# Patient Record
Sex: FEMALE | Race: BLACK OR AFRICAN AMERICAN | NOT HISPANIC OR LATINO | Employment: OTHER | URBAN - METROPOLITAN AREA
[De-identification: names, ages, dates, MRNs, and addresses within clinical notes are randomized per-mention and may not be internally consistent; named-entity substitution may affect disease eponyms.]

---

## 2022-10-11 ENCOUNTER — TELEPHONE (OUTPATIENT)
Dept: PLASTIC SURGERY | Facility: CLINIC | Age: 32
End: 2022-10-11

## 2022-10-11 NOTE — TELEPHONE ENCOUNTER
Spoke w/pt and informed her that her BMI of 38.9 is too high. Pt was given goal weight to reach BMI of 35 and instructed to call back to r/s once achieved.

## 2022-12-19 ENCOUNTER — OFFICE VISIT (OUTPATIENT)
Dept: PLASTIC SURGERY | Facility: CLINIC | Age: 32
End: 2022-12-19
Payer: MEDICAID

## 2022-12-19 VITALS
DIASTOLIC BLOOD PRESSURE: 87 MMHG | SYSTOLIC BLOOD PRESSURE: 131 MMHG | HEIGHT: 67 IN | BODY MASS INDEX: 36.94 KG/M2 | HEART RATE: 106 BPM | WEIGHT: 235.38 LBS

## 2022-12-19 DIAGNOSIS — N62 MACROMASTIA: Primary | ICD-10-CM

## 2022-12-19 PROCEDURE — 99204 PR OFFICE/OUTPT VISIT, NEW, LEVL IV, 45-59 MIN: ICD-10-PCS | Mod: S$GLB,,, | Performed by: SURGERY

## 2022-12-19 PROCEDURE — 3008F PR BODY MASS INDEX (BMI) DOCUMENTED: ICD-10-PCS | Mod: CPTII,S$GLB,, | Performed by: SURGERY

## 2022-12-19 PROCEDURE — 3008F BODY MASS INDEX DOCD: CPT | Mod: CPTII,S$GLB,, | Performed by: SURGERY

## 2022-12-19 PROCEDURE — 3079F PR MOST RECENT DIASTOLIC BLOOD PRESSURE 80-89 MM HG: ICD-10-PCS | Mod: CPTII,S$GLB,, | Performed by: SURGERY

## 2022-12-19 PROCEDURE — 1159F MED LIST DOCD IN RCRD: CPT | Mod: CPTII,S$GLB,, | Performed by: SURGERY

## 2022-12-19 PROCEDURE — 3075F SYST BP GE 130 - 139MM HG: CPT | Mod: CPTII,S$GLB,, | Performed by: SURGERY

## 2022-12-19 PROCEDURE — 99204 OFFICE O/P NEW MOD 45 MIN: CPT | Mod: S$GLB,,, | Performed by: SURGERY

## 2022-12-19 PROCEDURE — 1159F PR MEDICATION LIST DOCUMENTED IN MEDICAL RECORD: ICD-10-PCS | Mod: CPTII,S$GLB,, | Performed by: SURGERY

## 2022-12-19 PROCEDURE — 3075F PR MOST RECENT SYSTOLIC BLOOD PRESS GE 130-139MM HG: ICD-10-PCS | Mod: CPTII,S$GLB,, | Performed by: SURGERY

## 2022-12-19 PROCEDURE — 3079F DIAST BP 80-89 MM HG: CPT | Mod: CPTII,S$GLB,, | Performed by: SURGERY

## 2022-12-19 RX ORDER — CLINDAMYCIN HYDROCHLORIDE 300 MG/1
CAPSULE ORAL
COMMUNITY
Start: 2022-12-08

## 2022-12-19 NOTE — PROGRESS NOTES
"      REFERRAL FOR BREAST REDUCTION    Chief Complaint  Large breasts    Referring provider:  Primary Doctor No    HPI  Sobiaonereza Dhillon is a 32 y.o. female.  She currently wears a G cup bra and prefers to be smaller.   She has attempted to mitigate symptoms with weight loss last year and then gained weight back,  analgesics, and wearing extra bras.  Her weight has been fluctuating for 6 months.     She denies breast mass or skin change.   Personal hx of breast biopsy: none  Personalfamily history of breastovarian cancer: none    She reports associated complaints of:  Restricted physical activity  Feeling "hunched over"  Chronic breast pain secondary to weight       does not desire any more children  She works at home.    Tobacco use: none  Marijuana use: none    PMH  Past Medical History:   Diagnosis Date    Arthritis of ankle     Pelvic fracture          PSH  Past Surgical History:   Procedure Laterality Date    ANKLE SURGERY Right 2020    APPENDECTOMY      HIP FRACTURE SURGERY Right     PELVIC FRACTURE SURGERY Right        OBSTETRIC HISTORY  OB History    No obstetric history on file.         FH  Family History   Problem Relation Age of Onset    Diabetes Mother     Hypertension Mother     No Known Problems Father     No Known Problems Sister     No Known Problems Brother     Congenital heart disease Maternal Grandmother     No Known Problems Maternal Grandfather        MEDICATIONS  Outpatient Medications Marked as Taking for the 22 encounter (Office Visit) with Radha Paredes MD   Medication Sig Dispense Refill    clindamycin (CLEOCIN) 300 MG capsule          ALLERGIES   Review of patient's allergies indicates:   Allergen Reactions    Metronidazole (bulk) Rash    Penicillins     Flagyl [metronidazole] Rash    Percocet [oxycodone-acetaminophen] Rash       SOCIAL HISTORY  Tobacco:   Social History     Tobacco Use   Smoking Status Never   Smokeless Tobacco Never     EtOH:   Social History     Substance " "and Sexual Activity   Alcohol Use Yes    Comment: rarely         ROS  Review of Systems   Constitutional: Negative for chills, fever and malaise/fatigue.   HENT: Negative for congestion.    Eyes: Negative for blurred vision and double vision.   Respiratory: Negative for cough and sputum production.    Cardiovascular: Negative for chest pain and palpitations.   Gastrointestinal: Negative for nausea and vomiting.   Genitourinary: Negative for dysuria and hematuria.   Musculoskeletal: Positive for back pain and neck pain. Negative for joint pain.   Skin: Negative for itching and rash.   Neurological: Negative for dizziness, seizures and headaches.   Psychiatric/Behavioral: Negative for depression. The patient is not nervous/anxious.          PHYSICAL EXAM  /87   Pulse 106   Ht 5' 7" (1.702 m)   Wt 106.8 kg (235 lb 6.4 oz)   BMI 36.87 kg/m²   Body mass index is 36.87 kg/m².  Estimated body surface area is 2.25 meters squared as calculated from the following:    Height as of this encounter: 5' 7" (1.702 m).    Weight as of this encounter: 106.8 kg (235 lb 6.4 oz).    Constitutional: Pt is oriented to person, place, and time.  Pt appears well-developed and well-nourished.   HENT: Normocephalic and atraumatic.   Pulmonary/Chest: Effort normal. No respiratory distress.   Abdomen: Soft. Non-tender. No masses or distension.  Musculoskeletal: Normal range of motion. Pt exhibits no edema or deformity.   Neurological: Pt is alert and oriented to person, place, and time. No sensory deficit. Exhibits normal muscle tone.   Skin: Skin is warm. No rash noted. No erythema.         BREASTS  Asymmetry in size   Ptosis Grade 3  No Masses or skin change appreciated in either breast.  No lymphadenopathy  No active Excoriation or skin discoloration inframammary fold or midline  Trapezial Hypertrophy present  Areola widened, no nipple discharge elicited. Nipple sensation present.  Lipodystrophy: Axillary roll, lateral chest " wall  No Breast scars    Measurements  R SN to N- 37  L SN to N - 36    R N to IMF- 13  L N to IMF-13      PHOTOS                        ASSESSMENT  32 y.o. female with Macromastia, symptomatic > 6 months with sufficient evidence of functional impairments related to breast weight.       Today we discussed indications for surgery and I believe she would greatly benefit from a reduction of 978 g in each breast at least. We discussed a superior pedicle and keyhole pattern skin reduction to restore nipple position and achieve desired goal. The location of her scars were demonstrated today. We did discuss that her size would not be guaranteed, however we would ensure that her breasts are proportional to her chest frame.      Preoperative, perioperative and postoperative plans were discussed in detail. Risks of surgery and alternatives to surgery were also discussed today.     The influence of her BMI on her postoperative outcomes was discussed in detail today including but not limited to poor wound healing, infections, scarring, andor nipple loss. She does report a hindrance to proper exercise secondary to breast size and paindiscomfort, so I believe proceeding with reduction first would be beneficial for her. She understands risks and would like to proceed.     She is <35 years of age with no known family history of breast cancer or elevated personal risk. We will not pursue preoperative mammogram, but will establish baseline 6 mo after surgery andor at age 37.     She understands that any breast tissue removed during surgery will be weighed and sent to pathology for a thorough evaluation. She understands that this is not a cancer operation and that if atypicalpremalignantor malignant cells are found, further surgery may be warranted after the appropriate consultations with surgical oncology are completed.     She has considered these options and she would like to proceed with breast reduction.   We will provide  add on professional fees for her desired fat grafting to shoulder grooving, liposuction of axillary and chest wall lipodystrophy.   We took photos today to obtain pre-authorization for this clinically indicated procedure.  All of her questions were answered today to her satisfaction.       Radha Paredes MD FACS

## 2023-01-17 DIAGNOSIS — Z01.818 PREOP EXAMINATION: ICD-10-CM

## 2023-01-17 DIAGNOSIS — N62 MACROMASTIA: Primary | ICD-10-CM

## 2023-01-17 RX ORDER — ONDANSETRON 4 MG/1
4 TABLET, FILM COATED ORAL EVERY 6 HOURS PRN
Qty: 20 TABLET | Refills: 0 | Status: SHIPPED | OUTPATIENT
Start: 2023-01-17 | End: 2023-01-22

## 2023-01-17 RX ORDER — DOXYCYCLINE 100 MG/1
100 CAPSULE ORAL 2 TIMES DAILY
Qty: 14 CAPSULE | Refills: 0 | Status: SHIPPED | OUTPATIENT
Start: 2023-01-17 | End: 2023-01-24

## 2023-01-17 RX ORDER — HYDROCODONE BITARTRATE AND ACETAMINOPHEN 5; 325 MG/1; MG/1
TABLET ORAL
Qty: 20 TABLET | Refills: 0 | Status: SHIPPED | OUTPATIENT
Start: 2023-01-17

## 2023-01-17 RX ORDER — KETOROLAC TROMETHAMINE 10 MG/1
10 TABLET, FILM COATED ORAL EVERY 6 HOURS
Qty: 20 TABLET | Refills: 0 | Status: SHIPPED | OUTPATIENT
Start: 2023-01-17 | End: 2023-01-22

## 2023-01-17 NOTE — TELEPHONE ENCOUNTER
Patient is coming tomorrow 1/18/23 to preop for breast reduction. Please print and sign prescriptions will return for consents prior to surgery.

## 2023-01-18 ENCOUNTER — CLINICAL SUPPORT (OUTPATIENT)
Dept: PLASTIC SURGERY | Facility: CLINIC | Age: 33
End: 2023-01-18
Payer: MEDICAID

## 2023-01-18 LAB
ANION GAP SERPL CALC-SCNC: 6 MMOL/L (ref 3–11)
BASOPHILS NFR BLD: 0.4 % (ref 0–3)
BUN SERPL-MCNC: 9 MG/DL (ref 7–18)
BUN/CREAT SERPL: 10.34 RATIO (ref 7–18)
CALCIUM SERPL-MCNC: 9.4 MG/DL (ref 8.8–10.5)
CHLORIDE SERPL-SCNC: 104 MMOL/L (ref 100–108)
CO2 SERPL-SCNC: 28 MMOL/L (ref 21–32)
CREAT SERPL-MCNC: 0.87 MG/DL (ref 0.55–1.02)
EOSINOPHIL NFR BLD: 1.5 % (ref 1–3)
ERYTHROCYTE [DISTWIDTH] IN BLOOD BY AUTOMATED COUNT: 12 % (ref 12.5–18)
GFR ESTIMATION: > 60
GLUCOSE SERPL-MCNC: 73 MG/DL (ref 70–110)
HCT VFR BLD AUTO: 39.9 % (ref 37–47)
HGB BLD-MCNC: 13.4 G/DL (ref 12–16)
LYMPHOCYTES NFR BLD: 37.5 % (ref 25–40)
MCH RBC QN AUTO: 29.8 PG (ref 27–31.2)
MCHC RBC AUTO-ENTMCNC: 33.6 G/DL (ref 31.8–35.4)
MCV RBC AUTO: 88.7 FL (ref 80–97)
MONOCYTES NFR BLD: 6.8 % (ref 1–15)
NEUTROPHILS # BLD AUTO: 2.85 10*3/UL (ref 1.8–7.7)
NEUTROPHILS NFR BLD: 53.6 % (ref 37–80)
NUCLEATED RED BLOOD CELLS: 0 %
PLATELETS: 256 10*3/UL (ref 142–424)
POTASSIUM SERPL-SCNC: 3.6 MMOL/L (ref 3.6–5.2)
RBC # BLD AUTO: 4.5 10*6/UL (ref 4.2–5.4)
SODIUM BLD-SCNC: 138 MMOL/L (ref 135–145)
WBC # BLD: 5.3 10*3/UL (ref 4.6–10.2)

## 2023-01-18 NOTE — PROGRESS NOTES
Pt presents for preoperative and post operative instructions, scripts and orders given to patient. She is to return on Monday to sign consents with Dr. Paredes.  Cotinine negatigve

## 2023-01-19 ENCOUNTER — TELEPHONE (OUTPATIENT)
Dept: PLASTIC SURGERY | Facility: CLINIC | Age: 33
End: 2023-01-19
Payer: MEDICAID

## 2023-01-23 ENCOUNTER — OFFICE VISIT (OUTPATIENT)
Dept: PLASTIC SURGERY | Facility: CLINIC | Age: 33
End: 2023-01-23
Payer: MEDICAID

## 2023-01-23 VITALS
HEART RATE: 70 BPM | WEIGHT: 235 LBS | DIASTOLIC BLOOD PRESSURE: 68 MMHG | SYSTOLIC BLOOD PRESSURE: 120 MMHG | HEIGHT: 67 IN | BODY MASS INDEX: 36.88 KG/M2

## 2023-01-23 DIAGNOSIS — N62 MACROMASTIA: Primary | ICD-10-CM

## 2023-01-23 PROCEDURE — 1159F MED LIST DOCD IN RCRD: CPT | Mod: CPTII,S$GLB,, | Performed by: SURGERY

## 2023-01-23 PROCEDURE — 3008F PR BODY MASS INDEX (BMI) DOCUMENTED: ICD-10-PCS | Mod: CPTII,S$GLB,, | Performed by: SURGERY

## 2023-01-23 PROCEDURE — 99213 PR OFFICE/OUTPT VISIT, EST, LEVL III, 20-29 MIN: ICD-10-PCS | Mod: 57,S$GLB,, | Performed by: SURGERY

## 2023-01-23 PROCEDURE — 3078F PR MOST RECENT DIASTOLIC BLOOD PRESSURE < 80 MM HG: ICD-10-PCS | Mod: CPTII,S$GLB,, | Performed by: SURGERY

## 2023-01-23 PROCEDURE — 3078F DIAST BP <80 MM HG: CPT | Mod: CPTII,S$GLB,, | Performed by: SURGERY

## 2023-01-23 PROCEDURE — 3074F PR MOST RECENT SYSTOLIC BLOOD PRESSURE < 130 MM HG: ICD-10-PCS | Mod: CPTII,S$GLB,, | Performed by: SURGERY

## 2023-01-23 PROCEDURE — 3074F SYST BP LT 130 MM HG: CPT | Mod: CPTII,S$GLB,, | Performed by: SURGERY

## 2023-01-23 PROCEDURE — 3008F BODY MASS INDEX DOCD: CPT | Mod: CPTII,S$GLB,, | Performed by: SURGERY

## 2023-01-23 PROCEDURE — 99213 OFFICE O/P EST LOW 20 MIN: CPT | Mod: 57,S$GLB,, | Performed by: SURGERY

## 2023-01-23 PROCEDURE — 1159F PR MEDICATION LIST DOCUMENTED IN MEDICAL RECORD: ICD-10-PCS | Mod: CPTII,S$GLB,, | Performed by: SURGERY

## 2023-01-23 NOTE — PROGRESS NOTES
"Dictation #2  MRN:[id number]  CSN:[id number]       Preoperative exam for breast reduction     Chief Complaint  Large breasts    Referring provider:  Primary Doctor No    HPI  Sobiaonereza Dhillon is a 32 y.o. female.  She currently wears a G cup bra and prefers to be smaller.   She has attempted to mitigate symptoms with weight loss last year and then gained weight back,  analgesics, and wearing extra bras.  Her weight has been fluctuating for 6 months.     She denies breast mass or skin change.   Personal hx of breast biopsy: none  Personalfamily history of breastovarian cancer: none    She reports associated complaints of:  Restricted physical activity  Feeling "hunched over"  Chronic breast pain secondary to weight       does not desire any more children  She works at home.    Tobacco use: none  Marijuana use: none    PMH  Past Medical History:   Diagnosis Date    Arthritis of ankle     Pelvic fracture          PSH  Past Surgical History:   Procedure Laterality Date    ANKLE SURGERY Right 2020    APPENDECTOMY      HIP FRACTURE SURGERY Right     PELVIC FRACTURE SURGERY Right        OBSTETRIC HISTORY  OB History    No obstetric history on file.         FH  Family History   Problem Relation Age of Onset    Diabetes Mother     Hypertension Mother     No Known Problems Father     No Known Problems Sister     No Known Problems Brother     Congenital heart disease Maternal Grandmother     No Known Problems Maternal Grandfather        MEDICATIONS  No outpatient medications have been marked as taking for the 23 encounter (Office Visit) with Radha Paredes MD.       ALLERGIES   Review of patient's allergies indicates:   Allergen Reactions    Metronidazole (bulk) Rash    Penicillins     Flagyl [metronidazole] Rash    Percocet [oxycodone-acetaminophen] Rash       SOCIAL HISTORY  Tobacco:   Social History     Tobacco Use   Smoking Status Never   Smokeless Tobacco Never     EtOH:   Social History     Substance and Sexual " "Activity   Alcohol Use Yes    Comment: rarely         ROS  Review of Systems   Constitutional: Negative for chills, fever and malaise/fatigue.   HENT: Negative for congestion.    Eyes: Negative for blurred vision and double vision.   Respiratory: Negative for cough and sputum production.    Cardiovascular: Negative for chest pain and palpitations.   Gastrointestinal: Negative for nausea and vomiting.   Genitourinary: Negative for dysuria and hematuria.   Musculoskeletal: Positive for back pain and neck pain. Negative for joint pain.   Skin: Negative for itching and rash.   Neurological: Negative for dizziness, seizures and headaches.   Psychiatric/Behavioral: Negative for depression. The patient is not nervous/anxious.          PHYSICAL EXAM  /68   Pulse 70   Ht 5' 7" (1.702 m)   Wt 106.6 kg (235 lb)   BMI 36.81 kg/m²   Body mass index is 36.81 kg/m².  Estimated body surface area is 2.24 meters squared as calculated from the following:    Height as of this encounter: 5' 7" (1.702 m).    Weight as of this encounter: 106.6 kg (235 lb).    Constitutional: Pt is oriented to person, place, and time.  Pt appears well-developed and well-nourished.   HENT: Normocephalic and atraumatic.   Pulmonary/Chest: Effort normal. No respiratory distress.   Abdomen: Soft. Non-tender. No masses or distension.  Musculoskeletal: Normal range of motion. Pt exhibits no edema or deformity.   Neurological: Pt is alert and oriented to person, place, and time. No sensory deficit. Exhibits normal muscle tone.   Skin: Skin is warm. No rash noted. No erythema.         BREASTS  Asymmetry in size   Ptosis Grade 3  No Masses or skin change appreciated in either breast.  No lymphadenopathy  No active Excoriation or skin discoloration inframammary fold or midline  Trapezial Hypertrophy present  Areola widened, no nipple discharge elicited. Nipple sensation present.  Lipodystrophy: Axillary roll, lateral chest wall  No Breast " scars    Measurements  R SN to N- 37  L SN to N - 36    R N to IMF- 13  L N to IMF-13      PHOTOS                        ASSESSMENT  32 y.o. female with Macromastia, symptomatic > 6 months with sufficient evidence of functional impairments related to breast weight.       Today we discussed indications for surgery and I believe she would greatly benefit from a reduction of 978 g in each breast at least. We discussed a superior pedicle and keyhole pattern skin reduction to restore nipple position and achieve desired goal. The location of her scars were demonstrated today. We did discuss that her size would not be guaranteed, however we would ensure that her breasts are proportional to her chest frame.      Preoperative, perioperative and postoperative plans were discussed in detail. Risks of surgery and alternatives to surgery were also discussed today.     The influence of her BMI on her postoperative outcomes was discussed in detail today including but not limited to poor wound healing, infections, scarring, andor nipple loss. She does report a hindrance to proper exercise secondary to breast size and paindiscomfort, so I believe proceeding with reduction first would be beneficial for her. She understands risks and would like to proceed.     She is <35 years of age with no known family history of breast cancer or elevated personal risk. We will not pursue preoperative mammogram, but will establish baseline 6 mo after surgery andor at age 37.     She understands that any breast tissue removed during surgery will be weighed and sent to pathology for a thorough evaluation. She understands that this is not a cancer operation and that if atypicalpremalignantor malignant cells are found, further surgery may be warranted after the appropriate consultations with surgical oncology are completed.          INFORMED CONSENT  The procedure was fully discussed with the patient. Outpatient or hospital ambulatory surgery  disposition under general anesthesia were explained. She is a candidate for a keyhole superior pedicle; the location of her scars was demonstrated. Her idealized bra size after surgery was solicited for surgical planning, although I explained her bra size after surgery could not be guaranteed.    Procedure: Bilateral breast reduction  Nonsurgical and surgical treatment options were reviewed.  Possible risks of breast reduction include but are not limited to:  -bleeding  -infection  -heavy and prolonged or permanent scarring, possible keloid formation  -breast lumps, hardness  -breasts different size, shape  -loss of nipple sensation  -hypersensitivity of nipple-areolar complex  -wound separation or delayed wound healing  -venous thromboembolism  -complications from anesthesia  -difficulty with future mammograms  -emotional problems or negative impact on relationships from altered breast size  -desired breast size not attained: breasts too small or too large  -difficult bra fitting  -poor cosmetic outcome  -puckering of incisions  -irregular shape, nipple location  -need for further surgery  -inability to breast feed      PLAN        Disposition: outpatient or hospital ambulatory surgery  General anesthesia.  Labs: CBC, Electrolytes, pregnancy test, cotinine  Antibiotic prophylaxis: Cefazolin 2  GM preop  Caprini risk (3) Antiembolic prophylaxis with KACEY and sequential pneumatic devices.               Pt presents today for preoperative exam for bilateral breast reduction scheduled for 1/24/23. Consents reviewed with patient. Pre and post operative instructions given to patient. Copies of prescriptions, orders, instructions and consents given to patient today.

## 2023-01-24 ENCOUNTER — OUTSIDE PLACE OF SERVICE (OUTPATIENT)
Dept: PLASTIC SURGERY | Facility: CLINIC | Age: 33
End: 2023-01-24

## 2023-01-24 LAB
B-HCG UR QL: NEGATIVE
COTININE, URINE: NORMAL

## 2023-01-24 PROCEDURE — 19318 PR REDUCTION OF LARGE BREAST: ICD-10-PCS | Mod: 50,,, | Performed by: SURGERY

## 2023-01-24 PROCEDURE — 19318 BREAST REDUCTION: CPT | Mod: 50,,, | Performed by: SURGERY

## 2023-01-25 ENCOUNTER — TELEPHONE (OUTPATIENT)
Dept: PAIN MEDICINE | Facility: CLINIC | Age: 33
End: 2023-01-25
Payer: MEDICAID

## 2023-01-25 LAB — SPECIMEN TO PATHOLOGY: NORMAL

## 2023-01-25 NOTE — TELEPHONE ENCOUNTER
Pt doing well this am, states she had a small blood clot in her right drain but otherwise no issues. She is emptying her drains twice a day. Instructed she can shower tomorrow remove dressings and reapply clean dressings. Will see patient on Friday to check drains. Rediscussed how to milk to tubing as well.

## 2023-01-27 ENCOUNTER — CLINICAL SUPPORT (OUTPATIENT)
Dept: PLASTIC SURGERY | Facility: CLINIC | Age: 33
End: 2023-01-27
Payer: MEDICAID

## 2023-01-27 VITALS
HEART RATE: 86 BPM | HEIGHT: 67 IN | DIASTOLIC BLOOD PRESSURE: 85 MMHG | BODY MASS INDEX: 36.88 KG/M2 | SYSTOLIC BLOOD PRESSURE: 135 MMHG | OXYGEN SATURATION: 98 % | WEIGHT: 235 LBS

## 2023-01-27 DIAGNOSIS — Z98.890 STATUS POST BREAST REDUCTION: Primary | ICD-10-CM

## 2023-01-27 RX ORDER — DOXYCYCLINE 100 MG/1
100 CAPSULE ORAL 2 TIMES DAILY
COMMUNITY
Start: 2023-01-24

## 2023-01-27 RX ORDER — KETOROLAC TROMETHAMINE 10 MG/1
TABLET, FILM COATED ORAL
COMMUNITY
Start: 2023-01-24 | End: 2024-01-10

## 2023-01-27 NOTE — PROGRESS NOTES
"POST-OPERATIVE EXAM    CC  Chief Complaint   Patient presents with    Follow-up     3 days post breast reduction       PROCEDURE  Bilateral breast reduction 1/24/23    SUBJECTIVE  Preoperative symptoms have improved.  No fevers or pain uncontrolled.    Denies fevers, drainage, or wound concerns.   NATHAN drain 30 cc in each drain over the last 24 hours same for the previous 24 hours.  PHYSICAL EXAM  /85 (BP Location: Left arm, Patient Position: Sitting, BP Method: Small (Automatic))   Pulse 86   Ht 5' 7" (1.702 m)   Wt 106.6 kg (235 lb)   LMP  (LMP Unknown)   SpO2 98%   BMI 36.81 kg/m²   Breast symmetry and shape good, soft no fat necrosis  Minor bruising   Healing primarily  No masses  Nipples sensate  NAC healthy, viable  Surgical site  Incisions clean dry and intact, tape intact   No seroma or hematoma    ASSESSMENT  Encounter Diagnoses   Name Primary?    Status post breast reduction Yes     No signs of complications.  Patient is doing well after surgery.   Allow time for contour and scar maturation.          PLAN  Care/instructions were reviewed, written handout given (see AVS).  Continue sport bra/surgical bra  No upper body exercise/heavy lifting x 1 month  Ok to shower  F/u on Monday  Bilateral drains removed after cleaning with CHG, bacitracin and bandaide applied to openings. Instructed to shower daily, apply bacitracin and new bandage daily         WOUND CARE INSTRUCTIONS  BATHING  You may shower normally. No soaking tub bath or swimming pool until cleared by Dr. Paredes.    WOUND CARE  Reinforce incisions with adaptec/dry gauze until completely healed  Continue supportive sports bra for 6 weeks after surgery  You may resume wearing a bra with under-wire after 6 weeks or once any wounds are healed.    ACTIVITY  You may resume moderate exercise (walking, incline walking, stationary bike)   You may progress to full exercise after 4 weeks.  Avoid heavy lifting, running, swimming, strenuous activity for " 4 weeks.  Avoid travel out of town for 4 weeks.    MEDICATIONS  Take pain medication as needed:  Tylenol (acetominophen) 500 mg every 6 hours for mild pain.  Motrin (Ibuprofen) 600 mg every 8 hours for mild pain.  DO NOT exceed 4 grams of Tylenol in a 24 h period  Continue your usual medications and vitamins    SCAR MANAGEMENT  Scars may take over 1 year to mature.  Some scars will remain pink, dark purple, and possibly raised for 6-9 months after surgery.  After one year, scars often become flatter, smoother, and may change color.  After removal of the tape, suture removal, or when glue was used, apply a thin layer of Aquaphor (available at any drugstore) or antibiotic ointment to the scars for another 2 weeks.  Begin silicone when scars smooth, generally starting about 2 weeks after surgery.  Medical grade silicone gel is available on companies' web sites or on amazon.com  Brands recommended:  - Biocorneum  - Skin medica Scar Recovery Gel Skin Medica  Massage the scar twice daily for about 30 seconds

## 2023-01-30 ENCOUNTER — OFFICE VISIT (OUTPATIENT)
Dept: PLASTIC SURGERY | Facility: CLINIC | Age: 33
End: 2023-01-30
Payer: MEDICAID

## 2023-01-30 VITALS
HEART RATE: 71 BPM | WEIGHT: 230 LBS | BODY MASS INDEX: 36.1 KG/M2 | DIASTOLIC BLOOD PRESSURE: 84 MMHG | SYSTOLIC BLOOD PRESSURE: 134 MMHG | HEIGHT: 67 IN | OXYGEN SATURATION: 94 %

## 2023-01-30 DIAGNOSIS — Z98.890 STATUS POST BREAST REDUCTION: Primary | ICD-10-CM

## 2023-01-30 PROCEDURE — 1159F MED LIST DOCD IN RCRD: CPT | Mod: CPTII,S$GLB,, | Performed by: SURGERY

## 2023-01-30 PROCEDURE — 3008F PR BODY MASS INDEX (BMI) DOCUMENTED: ICD-10-PCS | Mod: CPTII,S$GLB,, | Performed by: SURGERY

## 2023-01-30 PROCEDURE — 3075F PR MOST RECENT SYSTOLIC BLOOD PRESS GE 130-139MM HG: ICD-10-PCS | Mod: CPTII,S$GLB,, | Performed by: SURGERY

## 2023-01-30 PROCEDURE — 3075F SYST BP GE 130 - 139MM HG: CPT | Mod: CPTII,S$GLB,, | Performed by: SURGERY

## 2023-01-30 PROCEDURE — 99024 PR POST-OP FOLLOW-UP VISIT: ICD-10-PCS | Mod: S$GLB,,, | Performed by: SURGERY

## 2023-01-30 PROCEDURE — 3008F BODY MASS INDEX DOCD: CPT | Mod: CPTII,S$GLB,, | Performed by: SURGERY

## 2023-01-30 PROCEDURE — 99024 POSTOP FOLLOW-UP VISIT: CPT | Mod: S$GLB,,, | Performed by: SURGERY

## 2023-01-30 PROCEDURE — 3079F PR MOST RECENT DIASTOLIC BLOOD PRESSURE 80-89 MM HG: ICD-10-PCS | Mod: CPTII,S$GLB,, | Performed by: SURGERY

## 2023-01-30 PROCEDURE — 3079F DIAST BP 80-89 MM HG: CPT | Mod: CPTII,S$GLB,, | Performed by: SURGERY

## 2023-01-30 PROCEDURE — 1159F PR MEDICATION LIST DOCUMENTED IN MEDICAL RECORD: ICD-10-PCS | Mod: CPTII,S$GLB,, | Performed by: SURGERY

## 2023-01-30 NOTE — PROGRESS NOTES
Loss appetite. Still having BMS. Medicine making her nauseated. Try gatorade Incisions look good. Happy with size  Take tapes off nest week  Can shower and wash with soap and water. Do not need any pads    Follow up ion 1 week

## 2023-01-30 NOTE — PROGRESS NOTES
"POST-OPERATIVE EXAM    CC  Chief Complaint   Patient presents with    Follow-up     1st post op visit drains removed bilateral breast reduction       PROCEDURE  Bilateral breast reduction 67232    SUBJECTIVE  Preoperative symptoms have improved.  No fevers or pain uncontrolled.         PHYSICAL EXAM  /84 (BP Location: Right arm, Patient Position: Sitting, BP Method: Small (Automatic))   Pulse 71   Ht 5' 7" (1.702 m)   Wt 104.3 kg (230 lb)   LMP  (LMP Unknown)   SpO2 (!) 94%   BMI 36.02 kg/m²   Breast symmetry and shape good, soft no fat necrosis  Minor bruising   Healing primarily  Scars pink, without hypertrophy  No masses  Nipples sensate  NAC healthy, viable    Surgical site  Incisions clean dry and intact  No seroma or hematoma        PATHOLOGY  1.  RIGHT BREAST TISSUE, REDUCTION MAMMOPLASTY:      - BENIGN MAMMARY TISSUE AND SKIN, WEIGHT 924.7 GRAMS.   2.  LEFT BREAST TISSUE, REDUCTION MAMMOPLASTY:      - BENIGN MAMMARY TISSUE AND SKIN, WEIGHT 899.4 GRAMS.     ASSESSMENT  No signs of complications.  Patient is doing well after surgery.   Allow time for contour and scar maturation.    PLAN  Care/instructions were reviewed  Continue sport bra/surgical bra  Ok to shower  F/u in 1 week        WOUND CARE INSTRUCTIONS  BATHING  You may shower normally. No soaking tub bath or swimming pool until cleared by Dr. Paredes.    WOUND CARE  You may leave the dressings off       ACTIVITY  You may resume moderate exercise (walking, incline walking, stationary bike)   You may progress to full exercise after 4 weeks.  Avoid heavy lifting, running, swimming, strenuous activity for 4 weeks.  Avoid travel out of town for 4 weeks.    MEDICATIONS  Take pain medication as needed:  Tylenol (acetominophen) 500 mg every 6 hours for mild pain.  Motrin (Ibuprofen) 600 mg every 8 hours for mild pain.  DO NOT exceed 4 grams of Tylenol in a 24 h period  Continue your usual medications and vitamins    SCAR MANAGEMENT  Scars may " take over 1 year to mature.  Some scars will remain pink, dark purple, and possibly raised for 6-9 months after surgery.  After one year, scars often become flatter, smoother, and may change color.  After removal of the tape, suture removal, or when glue was used, apply a thin layer of Aquaphor (available at any drugstore) or antibiotic ointment to the scars for another 2 weeks.  Begin silicone when scars smooth, generally starting about 2 weeks after surgery.  Medical grade silicone gel is available on companies' web sites or on amazon.com  Brands recommended:  - Biocorneum  - Skin medica Scar Recovery Gel Skin Medica  Massage the scar twice daily for about 30 seconds

## 2023-02-06 ENCOUNTER — OFFICE VISIT (OUTPATIENT)
Dept: PLASTIC SURGERY | Facility: CLINIC | Age: 33
End: 2023-02-06
Payer: MEDICAID

## 2023-02-06 VITALS
OXYGEN SATURATION: 98 % | SYSTOLIC BLOOD PRESSURE: 130 MMHG | HEART RATE: 68 BPM | DIASTOLIC BLOOD PRESSURE: 86 MMHG | BODY MASS INDEX: 36.1 KG/M2 | HEIGHT: 67 IN | WEIGHT: 230 LBS

## 2023-02-06 DIAGNOSIS — Z98.890 STATUS POST BREAST REDUCTION: Primary | ICD-10-CM

## 2023-02-06 PROCEDURE — 3008F PR BODY MASS INDEX (BMI) DOCUMENTED: ICD-10-PCS | Mod: CPTII,S$GLB,, | Performed by: SURGERY

## 2023-02-06 PROCEDURE — 99024 PR POST-OP FOLLOW-UP VISIT: ICD-10-PCS | Mod: S$GLB,,, | Performed by: SURGERY

## 2023-02-06 PROCEDURE — 3079F PR MOST RECENT DIASTOLIC BLOOD PRESSURE 80-89 MM HG: ICD-10-PCS | Mod: CPTII,S$GLB,, | Performed by: SURGERY

## 2023-02-06 PROCEDURE — 1159F PR MEDICATION LIST DOCUMENTED IN MEDICAL RECORD: ICD-10-PCS | Mod: CPTII,S$GLB,, | Performed by: SURGERY

## 2023-02-06 PROCEDURE — 3075F PR MOST RECENT SYSTOLIC BLOOD PRESS GE 130-139MM HG: ICD-10-PCS | Mod: CPTII,S$GLB,, | Performed by: SURGERY

## 2023-02-06 PROCEDURE — 99024 POSTOP FOLLOW-UP VISIT: CPT | Mod: S$GLB,,, | Performed by: SURGERY

## 2023-02-06 PROCEDURE — 1159F MED LIST DOCD IN RCRD: CPT | Mod: CPTII,S$GLB,, | Performed by: SURGERY

## 2023-02-06 PROCEDURE — 3079F DIAST BP 80-89 MM HG: CPT | Mod: CPTII,S$GLB,, | Performed by: SURGERY

## 2023-02-06 PROCEDURE — 3008F BODY MASS INDEX DOCD: CPT | Mod: CPTII,S$GLB,, | Performed by: SURGERY

## 2023-02-06 PROCEDURE — 3075F SYST BP GE 130 - 139MM HG: CPT | Mod: CPTII,S$GLB,, | Performed by: SURGERY

## 2023-02-06 NOTE — PROGRESS NOTES
"POST-OPERATIVE EXAM    CC  Chief Complaint   Patient presents with    Follow-up     2 wk post-op breast reduction. Remove tape and sutures       PROCEDURE  Breast Reduction 11383    SUBJECTIVE  Preoperative symptoms have improved.  Denies fevers, drainage, or wound concerns.     PHYSICAL EXAM  Ht 5' 7" (1.702 m)   Wt 104.3 kg (230 lb)   LMP  (LMP Unknown)   BMI 36.02 kg/m²   Breast symmetry and shape good, soft no fat necrosis  Minor bruising   Healing primarily  Scars pink, without hypertrophy  No masses  Nipples sensate  NAC healthy, viable    Surgical site  Incisions clean dry and intact  No seroma or hematoma      ASSESSMENT  No signs of complications.  Patient is doing well after surgery.   Allow time for contour and scar maturation.    PLAN  Care/instructions were reviewed, written handout given (see AVS).  Continue sport bra/surgical bra  Ok to shower  F/u 2 weeks        WOUND CARE INSTRUCTIONS  BATHING  You may shower normally. No soaking tub bath or swimming pool until cleared by Dr. Paredes.    WOUND CARE  You may leave the dressings off  Reinforce incisions with adaptec/dry gauze until completely healed  Apply clear antibiotic ointment (Bacitracin) on incisions  Apply aquaphor only to incisions once daily  Continue supportive sports bra for 6 weeks after surgery  You may resume wearing a bra with under-wire after 6 weeks or once any wounds are healed.    SUTURES  Sutures do not require further removal    ACTIVITY  You may resume moderate exercise (walking, incline walking, stationary bike)   You may progress to full exercise after 4 weeks.  Avoid heavy lifting, running, swimming, strenuous activity for 4 weeks.  Avoid travel out of town for 4 weeks.    MEDICATIONS  Take pain medication as needed:  Tylenol (acetominophen) 500 mg every 6 hours for mild pain.  Motrin (Ibuprofen) 600 mg every 8 hours for mild pain.  DO NOT exceed 4 grams of Tylenol in a 24 h period  Continue your usual medications and " vitamins    SCAR MANAGEMENT  Scars may take over 1 year to mature.  Some scars will remain pink, dark purple, and possibly raised for 6-9 months after surgery.  After one year, scars often become flatter, smoother, and may change color.  After removal of the tape, suture removal, or when glue was used, apply a thin layer of Aquaphor (available at any drugstore) or antibiotic ointment to the scars for another 2 weeks.  Begin silicone when scars smooth, generally starting about 2 weeks after surgery.  Medical grade silicone gel is available on companies' web sites or on amazon.com  Brands recommended:  - Biocorneum  - Skin medica Scar Recovery Gel Skin Medica  Massage the scar twice daily for about 30 seconds

## 2023-02-20 ENCOUNTER — PATIENT MESSAGE (OUTPATIENT)
Dept: PLASTIC SURGERY | Facility: CLINIC | Age: 33
End: 2023-02-20

## 2023-02-27 ENCOUNTER — OFFICE VISIT (OUTPATIENT)
Dept: PLASTIC SURGERY | Facility: CLINIC | Age: 33
End: 2023-02-27
Payer: MEDICAID

## 2023-02-27 VITALS
HEIGHT: 67 IN | WEIGHT: 227 LBS | OXYGEN SATURATION: 98 % | DIASTOLIC BLOOD PRESSURE: 81 MMHG | BODY MASS INDEX: 35.63 KG/M2 | HEART RATE: 70 BPM | SYSTOLIC BLOOD PRESSURE: 119 MMHG

## 2023-02-27 DIAGNOSIS — N62 MACROMASTIA: Primary | ICD-10-CM

## 2023-02-27 DIAGNOSIS — Z98.890 STATUS POST BREAST REDUCTION: ICD-10-CM

## 2023-02-27 PROCEDURE — 3074F PR MOST RECENT SYSTOLIC BLOOD PRESSURE < 130 MM HG: ICD-10-PCS | Mod: CPTII,S$GLB,, | Performed by: SURGERY

## 2023-02-27 PROCEDURE — 99024 POSTOP FOLLOW-UP VISIT: CPT | Mod: S$GLB,,, | Performed by: SURGERY

## 2023-02-27 PROCEDURE — 99024 PR POST-OP FOLLOW-UP VISIT: ICD-10-PCS | Mod: S$GLB,,, | Performed by: SURGERY

## 2023-02-27 PROCEDURE — 3079F PR MOST RECENT DIASTOLIC BLOOD PRESSURE 80-89 MM HG: ICD-10-PCS | Mod: CPTII,S$GLB,, | Performed by: SURGERY

## 2023-02-27 PROCEDURE — 1159F MED LIST DOCD IN RCRD: CPT | Mod: CPTII,S$GLB,, | Performed by: SURGERY

## 2023-02-27 PROCEDURE — 3074F SYST BP LT 130 MM HG: CPT | Mod: CPTII,S$GLB,, | Performed by: SURGERY

## 2023-02-27 PROCEDURE — 3008F PR BODY MASS INDEX (BMI) DOCUMENTED: ICD-10-PCS | Mod: CPTII,S$GLB,, | Performed by: SURGERY

## 2023-02-27 PROCEDURE — 3008F BODY MASS INDEX DOCD: CPT | Mod: CPTII,S$GLB,, | Performed by: SURGERY

## 2023-02-27 PROCEDURE — 1159F PR MEDICATION LIST DOCUMENTED IN MEDICAL RECORD: ICD-10-PCS | Mod: CPTII,S$GLB,, | Performed by: SURGERY

## 2023-02-27 PROCEDURE — 3079F DIAST BP 80-89 MM HG: CPT | Mod: CPTII,S$GLB,, | Performed by: SURGERY

## 2023-02-27 NOTE — PROGRESS NOTES
"POST-OPERATIVE EXAM    CC  Chief Complaint   Patient presents with    Follow-up     Post-op breast reduction 1/24/23       PROCEDURE  Breast Reduction 24464    SUBJECTIVE  Preoperative symptoms have improved.  Denies fevers, drainage, or wound concerns.     PHYSICAL EXAM  /81 (BP Location: Right arm, Patient Position: Sitting, BP Method: Large (Automatic))   Pulse 70   Ht 5' 7" (1.702 m)   Wt 103 kg (227 lb)   LMP  (LMP Unknown)   SpO2 98%   BMI 35.55 kg/m²   Breast symmetry and shape good, soft no fat necrosis  Healing primarily  Scars pink, without hypertrophy  No masses  Nipples sensate  NAC healthy, viable    Surgical site  Incisions clean dry and intact  No seroma or hematoma      ASSESSMENT  No signs of complications.  Patient is doing well after surgery.   Allow time for contour and scar maturation.    PLAN  Care/instructions were reviewed, written handout given (see AVS).  Continue sport bra/surgical bra  Ok to shower  Massage scar incisions  F/u 2 months        WOUND CARE INSTRUCTIONS  BATHING  No restrictions cleared by Dr. Paredes.    WOUND CARE  You may leave the dressings off    SUTURES  Sutures do not require further removal    ACTIVITY  You may resume moderate exercise (walking, incline walking, stationary bike)           MEDICATIONS  Take pain medication as needed:  Tylenol (acetominophen) 500 mg every 6 hours for mild pain.  Motrin (Ibuprofen) 600 mg every 8 hours for mild pain.  DO NOT exceed 4 grams of Tylenol in a 24 h period  Continue your usual medications and vitamins    SCAR MANAGEMENT  Scars may take over 1 year to mature.  Some scars will remain pink, dark purple, and possibly raised for 6-9 months after surgery.  After one year, scars often become flatter, smoother, and may change color.  After removal of the tape, suture removal, or when glue was used, apply a thin layer of Aquaphor (available at any drugstore) or antibiotic ointment to the scars for another 2 weeks.  Begin " silicone when scars smooth, generally starting about 2 weeks after surgery.  Medical grade silicone gel is available on companies' web sites or on amazon.com  Brands recommended:  - Biocorneum  - Skin medica Scar Recovery Gel Skin Medica  Massage the scar twice daily for about 30 seconds

## 2023-03-09 ENCOUNTER — OFFICE VISIT (OUTPATIENT)
Dept: PLASTIC SURGERY | Facility: CLINIC | Age: 33
End: 2023-03-09
Payer: MEDICAID

## 2023-03-09 VITALS
DIASTOLIC BLOOD PRESSURE: 75 MMHG | WEIGHT: 227 LBS | SYSTOLIC BLOOD PRESSURE: 112 MMHG | BODY MASS INDEX: 35.63 KG/M2 | OXYGEN SATURATION: 98 % | HEART RATE: 73 BPM | HEIGHT: 67 IN

## 2023-03-09 DIAGNOSIS — M54.9 BACK PAIN, UNSPECIFIED BACK LOCATION, UNSPECIFIED BACK PAIN LATERALITY, UNSPECIFIED CHRONICITY: Primary | ICD-10-CM

## 2023-03-09 PROCEDURE — 3008F PR BODY MASS INDEX (BMI) DOCUMENTED: ICD-10-PCS | Mod: CPTII,S$GLB,, | Performed by: SURGERY

## 2023-03-09 PROCEDURE — 99024 POSTOP FOLLOW-UP VISIT: CPT | Mod: S$GLB,,, | Performed by: SURGERY

## 2023-03-09 PROCEDURE — 3078F DIAST BP <80 MM HG: CPT | Mod: CPTII,S$GLB,, | Performed by: SURGERY

## 2023-03-09 PROCEDURE — 1159F PR MEDICATION LIST DOCUMENTED IN MEDICAL RECORD: ICD-10-PCS | Mod: CPTII,S$GLB,, | Performed by: SURGERY

## 2023-03-09 PROCEDURE — 99024 PR POST-OP FOLLOW-UP VISIT: ICD-10-PCS | Mod: S$GLB,,, | Performed by: SURGERY

## 2023-03-09 PROCEDURE — 3074F PR MOST RECENT SYSTOLIC BLOOD PRESSURE < 130 MM HG: ICD-10-PCS | Mod: CPTII,S$GLB,, | Performed by: SURGERY

## 2023-03-09 PROCEDURE — 3074F SYST BP LT 130 MM HG: CPT | Mod: CPTII,S$GLB,, | Performed by: SURGERY

## 2023-03-09 PROCEDURE — 1159F MED LIST DOCD IN RCRD: CPT | Mod: CPTII,S$GLB,, | Performed by: SURGERY

## 2023-03-09 PROCEDURE — 3008F BODY MASS INDEX DOCD: CPT | Mod: CPTII,S$GLB,, | Performed by: SURGERY

## 2023-03-09 PROCEDURE — 3078F PR MOST RECENT DIASTOLIC BLOOD PRESSURE < 80 MM HG: ICD-10-PCS | Mod: CPTII,S$GLB,, | Performed by: SURGERY

## 2023-03-09 NOTE — PROGRESS NOTES
"POST-OPERATIVE EXAM    CC  Chief Complaint   Patient presents with    Follow-up     Unbearable bilateral incision pain-post bilateral breast reduction 1/24/23       PROCEDURE  Breast Reduction 08394    SUBJECTIVE  Took grandmother's pain meds for some relief  Localized to left inferior and lateral    PHYSICAL EXAM  /75 (BP Location: Left arm, Patient Position: Sitting, BP Method: Large (Automatic))   Pulse 73   Ht 5' 7" (1.702 m)   Wt 103 kg (227 lb)   LMP  (LMP Unknown)   SpO2 98%   BMI 35.55 kg/m²   Breast symmetry and shape good, soft   Healing primarily  Scars pink, without hypertrophy  No masses  Nipples sensate  NAC healthy, viable    Surgical site  Incisions clean dry and intact  No seroma or hematoma  No fibrosis or fat necrosis    ASSESSMENT  No signs of complications.  Patient is doing well after surgery.   Allow time for contour and scar maturation.  Continue scar massages to bilateral breast  Follow-up with PCP    PLAN  Referral to [facility] for lymphatic massages  Referral to PT-the therapy center  Massage scar incisions  F/u 3 months        WOUND CARE INSTRUCTIONS  BATHING  No restrictions cleared by Dr. Paredes.      ACTIVITY  You may resume moderate exercise (walking, incline walking, stationary bike)           MEDICATIONS  Continue your usual medications and vitamins    SCAR MANAGEMENT  Scars may take over 1 year to mature.  Some scars will remain pink, dark purple, and possibly raised for 6-9 months after surgery.  After one year, scars often become flatter, smoother, and may change color.  After removal of the tape, suture removal, or when glue was used, apply a thin layer of Aquaphor (available at any drugstore) or antibiotic ointment to the scars for another 2 weeks.  Begin silicone when scars smooth, generally starting about 2 weeks after surgery.  Medical grade silicone gel is available on companies' web sites or on amazon.com  Brands recommended:  - Biocorneum  - Skin medica Scar " Recovery Gel Skin Medica  Massage the scar twice daily for about 30 seconds

## 2023-09-12 ENCOUNTER — TELEPHONE (OUTPATIENT)
Dept: PLASTIC SURGERY | Facility: CLINIC | Age: 33
End: 2023-09-12
Payer: MEDICAID

## 2024-01-10 ENCOUNTER — OFFICE VISIT (OUTPATIENT)
Dept: URGENT CARE | Facility: CLINIC | Age: 34
End: 2024-01-10
Payer: MEDICAID

## 2024-01-10 VITALS
WEIGHT: 227 LBS | RESPIRATION RATE: 15 BRPM | SYSTOLIC BLOOD PRESSURE: 115 MMHG | HEIGHT: 67 IN | HEART RATE: 68 BPM | OXYGEN SATURATION: 98 % | BODY MASS INDEX: 35.63 KG/M2 | DIASTOLIC BLOOD PRESSURE: 81 MMHG | TEMPERATURE: 98 F

## 2024-01-10 DIAGNOSIS — B96.89 ACUTE BACTERIAL BRONCHITIS: Primary | ICD-10-CM

## 2024-01-10 DIAGNOSIS — R05.1 ACUTE COUGH: ICD-10-CM

## 2024-01-10 DIAGNOSIS — J20.8 ACUTE BACTERIAL BRONCHITIS: Primary | ICD-10-CM

## 2024-01-10 DIAGNOSIS — R07.89 CHEST DISCOMFORT: ICD-10-CM

## 2024-01-10 DIAGNOSIS — R07.89 COSTOCHONDRAL CHEST PAIN: ICD-10-CM

## 2024-01-10 PROCEDURE — 99203 OFFICE O/P NEW LOW 30 MIN: CPT | Mod: S$GLB,,, | Performed by: STUDENT IN AN ORGANIZED HEALTH CARE EDUCATION/TRAINING PROGRAM

## 2024-01-10 RX ORDER — AMOXICILLIN AND CLAVULANATE POTASSIUM 875; 125 MG/1; MG/1
TABLET, FILM COATED ORAL
COMMUNITY
Start: 2023-11-28

## 2024-01-10 RX ORDER — MEDROXYPROGESTERONE ACETATE 104 MG/.65ML
INJECTION, SUSPENSION SUBCUTANEOUS
COMMUNITY
Start: 2023-11-07

## 2024-01-10 RX ORDER — IBUPROFEN 400 MG/1
TABLET ORAL
COMMUNITY
Start: 2023-11-28 | End: 2024-01-10

## 2024-01-10 RX ORDER — NAPROXEN 500 MG/1
500 TABLET ORAL 2 TIMES DAILY WITH MEALS
Qty: 14 TABLET | Refills: 0 | Status: SHIPPED | OUTPATIENT
Start: 2024-01-10 | End: 2024-01-17

## 2024-01-10 RX ORDER — DOXYCYCLINE 100 MG/1
100 CAPSULE ORAL 2 TIMES DAILY
Qty: 14 CAPSULE | Refills: 0 | Status: SHIPPED | OUTPATIENT
Start: 2024-01-10 | End: 2024-01-17

## 2024-01-10 RX ORDER — PROMETHAZINE HYDROCHLORIDE AND DEXTROMETHORPHAN HYDROBROMIDE 6.25; 15 MG/5ML; MG/5ML
5 SYRUP ORAL EVERY 4 HOURS PRN
Qty: 118 ML | Refills: 0 | Status: SHIPPED | OUTPATIENT
Start: 2024-01-10 | End: 2024-01-20

## 2024-01-10 NOTE — PROGRESS NOTES
"Subjective:      Patient ID: Bayron Dhillon is a 33 y.o. female.    Vitals:  height is 5' 7" (1.702 m) and weight is 103 kg (227 lb). Her temperature is 98.1 °F (36.7 °C). Her blood pressure is 115/81 and her pulse is 68. Her respiration is 15 and oxygen saturation is 98%.     Chief Complaint: No chief complaint on file.    Patient states that about a week ago she started coughing. Sunday she started feels a pain in her chest and back especially when she moves her arms like stretching. It comes and goes.       The Chest pain started last night. When she stretches back she feels the pain more. She has been sick and has been coughing a lot at night. Her grandbaby has covid and she has been sick ever since. She was not tested for anything. She has a productive cough that has green mucous. The pain is in the middle of her chest. She has a history of breast reduction and sometimes has pain off and on. The pain is a sharp stabbing pain. The pain is 7/10. No personal history of MI or CVA. She does not smoke or drinks alcohol. Denied drug use. Her grandmother has CHF.     Chest Pain   Associated symptoms include a cough and sputum production. Pertinent negatives include no fever or shortness of breath.   Cough  This is a new problem. The current episode started in the past 7 days. The problem has been gradually worsening. The problem occurs every few hours. Associated symptoms include chest pain. Pertinent negatives include no chills, fever or shortness of breath. Nothing aggravates the symptoms. She has tried nothing for the symptoms.       Constitution: Negative for chills and fever.   Cardiovascular:  Positive for chest pain. Negative for leg swelling.   Respiratory:  Positive for cough and sputum production. Negative for shortness of breath and asthma.    Allergic/Immunologic: Negative for asthma.      Objective:     Physical Exam   HENT:   Head: Normocephalic and atraumatic.   Mouth/Throat: Mucous membranes are moist. " Oropharynx is clear.   Cardiovascular: Normal rate, regular rhythm and normal heart sounds.      Comments: Tendernss to palpation of the substernal wall  Sunni with puitting hands behind her back   Pulmonary/Chest: Effort normal and breath sounds normal.   Abdominal: Normal appearance.   Neurological: She is alert.       Assessment:     1. Acute bacterial bronchitis    2. Acute cough    3. Chest discomfort        Plan:     -declined EKG  Acute bacterial bronchitis  -     doxycycline (VIBRAMYCIN) 100 MG Cap; Take 1 capsule (100 mg total) by mouth 2 (two) times daily. for 7 days  Dispense: 14 capsule; Refill: 0  -     XR CHEST PA AND LATERAL; Future; Expected date: 01/10/2024    Acute cough  -     promethazine-dextromethorphan (PROMETHAZINE-DM) 6.25-15 mg/5 mL Syrp; Take 5 mLs by mouth every 4 (four) hours as needed (cough).  Dispense: 118 mL; Refill: 0    Chest discomfort  -     naproxen (NAPROSYN) 500 MG tablet; Take 1 tablet (500 mg total) by mouth 2 (two) times daily with meals. for 7 days  Dispense: 14 tablet; Refill: 0      Please follow up with your primary care provider within 2-5 days if your signs and symptoms have not resolved or worsen.     If your condition worsens or fails to improve we recommend that you receive another evaluation at the emergency room immediately or contact your primary medical clinic to discuss your concerns.   You must understand that you have received an Urgent Care treatment only and that you may be released before all of your medical problems are known or treated. You, the patient, will arrange for follow up care as instructed.          Please go to [facility] ER or [facility] ER to get a chest xray done. We al call you with the results.     Medical Decision Making:   Differential Diagnosis:   Acute bacterial bronchitis  Chest pain could be secondary to costochondritis versus pleuritic chest pain  Clinical Tests:   Radiological Study: Ordered  Urgent Care Management:  Patient states  that about a week ago she started coughing. Sunday she started feels a pain in her chest and back especially when she moves her arms like stretching. It comes and goes.   The Chest pain started last night. When she stretches back she feels the pain more. She has been sick and has been coughing a lot at night. Her grandbaby has covid and she has been sick ever since. She was not tested for anything. She has a productive cough that has green mucous. The pain is in the middle of her chest. She has a history of breast reduction and sometimes has pain off and on. The pain is a sharp stabbing pain. The pain is 7/10. No personal history of MI or CVA. She does not smoke or drinks alcohol. Denied drug use. Her grandmother has CHF.   Physical Exam   HENT:   Head: Normocephalic and atraumatic.   Mouth/Throat: Mucous membranes are moist. Oropharynx is clear.   Cardiovascular: Normal rate, regular rhythm and normal heart sounds.      Comments: Tendernss to palpation of the substernal wall  Sunni with puitting hands behind her back   Pulmonary/Chest: Effort normal and breath sounds normal.   Abdominal: Normal appearance.   Neurological: She is alert.   I ordered a chest x-ray for the patient.  The patient declined EKG because she stated that she did not feel like the pain was heart related.  The patient was sent home with some cough medications, doxycycline, and naproxen for chest pain.  ED and return precautions were given.  The patient vocalized understanding.

## 2024-01-10 NOTE — PATIENT INSTRUCTIONS
Please follow up with your primary care provider within 2-5 days if your signs and symptoms have not resolved or worsen.     If your condition worsens or fails to improve we recommend that you receive another evaluation at the emergency room immediately or contact your primary medical clinic to discuss your concerns.   You must understand that you have received an Urgent Care treatment only and that you may be released before all of your medical problems are known or treated. You, the patient, will arrange for follow up care as instructed.          Please go to [facility] ER or [facility] ER to get a chest xray done. We al call you with the results.

## 2024-01-10 NOTE — LETTER
January 10, 2024      [facility]  [facility] [facility] [place] [address]  Phone: [phone]  Fax: [phone]       Patient: Bayron Dhillon   YOB: 1990  Date of Visit: 01/10/2024    To Whom It May Concern:    Hal Dhillon  was at [facility] on 01/10/2024. The patient may return to work/school on 01/12/2024 with no restrictions. If you have any questions or concerns, or if I can be of further assistance, please do not hesitate to contact me.    Sincerely,    Lucio Cid MA